# Patient Record
Sex: MALE | Race: WHITE | URBAN - NONMETROPOLITAN AREA
[De-identification: names, ages, dates, MRNs, and addresses within clinical notes are randomized per-mention and may not be internally consistent; named-entity substitution may affect disease eponyms.]

---
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
## 2020-10-19 ENCOUNTER — OFFICE VISIT (OUTPATIENT)
Dept: FAMILY MEDICINE CLINIC | Facility: CLINIC | Age: 20
End: 2020-10-19
Payer: COMMERCIAL

## 2020-10-19 VITALS
BODY MASS INDEX: 37.37 KG/M2 | HEIGHT: 70 IN | DIASTOLIC BLOOD PRESSURE: 78 MMHG | HEART RATE: 73 BPM | WEIGHT: 261 LBS | OXYGEN SATURATION: 99 % | TEMPERATURE: 97 F | SYSTOLIC BLOOD PRESSURE: 118 MMHG | RESPIRATION RATE: 18 BRPM

## 2020-10-19 DIAGNOSIS — N50.3 EPIDIDYMAL CYST: Primary | ICD-10-CM

## 2020-10-19 PROCEDURE — 3078F DIAST BP <80 MM HG: CPT | Performed by: FAMILY MEDICINE

## 2020-10-19 PROCEDURE — 3008F BODY MASS INDEX DOCD: CPT | Performed by: FAMILY MEDICINE

## 2020-10-19 PROCEDURE — 99214 OFFICE O/P EST MOD 30 MIN: CPT | Performed by: FAMILY MEDICINE

## 2020-10-19 PROCEDURE — 3074F SYST BP LT 130 MM HG: CPT | Performed by: FAMILY MEDICINE

## 2020-10-19 NOTE — PROGRESS NOTES
Vesta Rankin is a 21year old male. Patient presents with:   Other: Lump on left testicle       Chief Complaint Reviewed and Verified  Nursing Notes Reviewed and   Verified  Tobacco Reviewed  Allergies Reviewed  Medications Reviewed    Problem List Revie cellulitis  No varicocele  No Hydrocele  No hernia         ASSESSMENT AND PLAN:     Epididymal cyst  (primary encounter diagnosis)    Problem List Items Addressed This Visit     None      Visit Diagnoses     Epididymal cyst    -  Primary    observe only

## 2022-07-16 LAB — AMB EXT COVID-19 RESULT: DETECTED

## 2022-07-19 ENCOUNTER — TELEPHONE (OUTPATIENT)
Dept: FAMILY MEDICINE CLINIC | Facility: CLINIC | Age: 22
End: 2022-07-19
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
## 2022-07-19 NOTE — TELEPHONE ENCOUNTER
Future Appointments   Date Time Provider tSev[address]   7/22/2022 10:00 AM Fernando Irvin MD [facility]     Left msg for pt advising appt has been scheduled. Advised to call office to confirm this time works for him.

## 2022-07-19 NOTE — TELEPHONE ENCOUNTER
COVID+ on 7/16 (at home test) and 7/17 (at [place]). He has a Leave of Absence and RTW form that need to be filled out before returning to work. He would like to to RTW on Friday. Sx onset: 7/14/22. His sx (congestion, chills, sore throat) have resolved. He only has a mild cough. He has not been seen in office in about 2 yrs. He's willing to schedule an appt if needed. Please advise.

## 2022-07-22 ENCOUNTER — OFFICE VISIT (OUTPATIENT)
Dept: FAMILY MEDICINE CLINIC | Facility: CLINIC | Age: 22
End: 2022-07-22
Payer: COMMERCIAL

## 2022-07-22 VITALS
HEIGHT: 70 IN | OXYGEN SATURATION: 98 % | RESPIRATION RATE: 18 BRPM | SYSTOLIC BLOOD PRESSURE: 118 MMHG | DIASTOLIC BLOOD PRESSURE: 78 MMHG | TEMPERATURE: 98 F | WEIGHT: 237.13 LBS | HEART RATE: 79 BPM | BODY MASS INDEX: 33.95 KG/M2

## 2022-07-22 DIAGNOSIS — U07.1 COVID-19: Primary | ICD-10-CM

## 2022-07-22 PROCEDURE — 3078F DIAST BP <80 MM HG: CPT | Performed by: FAMILY MEDICINE

## 2022-07-22 PROCEDURE — 3074F SYST BP LT 130 MM HG: CPT | Performed by: FAMILY MEDICINE

## 2022-07-22 PROCEDURE — 3008F BODY MASS INDEX DOCD: CPT | Performed by: FAMILY MEDICINE

## 2022-07-22 PROCEDURE — 99213 OFFICE O/P EST LOW 20 MIN: CPT | Performed by: FAMILY MEDICINE
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
## 2024-01-02 ENCOUNTER — PATIENT MESSAGE (OUTPATIENT)
Dept: FAMILY MEDICINE CLINIC | Facility: CLINIC | Age: 24
End: 2024-01-02

## 2024-01-02 NOTE — TELEPHONE ENCOUNTER
From: Renard Powers  To: Elroy Irvin  Sent: 1/2/2024 1:34 PM CST  Subject: Influenza A test positive    I just tested positive for Influenza A, I was wondering what you would suggest I do? Take a leave from work or get some medication? Thank you.

## 2024-01-02 NOTE — TELEPHONE ENCOUNTER
See my chart message.    Per patient started with symptoms 3 days ago and are cold/hot flashes, nasal congestion, sore throat, productive cough, headache, Reports temperature of 98.3. Taking ibuprofen and zinc tablets. No shortness of breath. Did not go to work today and next scheduled shift is tomorrow at [employer]. Has increased fluids and rest.    Please advise.

## 2024-01-02 NOTE — TELEPHONE ENCOUNTER
Patient notified of provider comments/recommendations.  Verbalized understanding, does not feel he needs a work not but may need paperwork completed. Will contact office when he has further information on that.

## 2024-01-02 NOTE — TELEPHONE ENCOUNTER
Needs to be off of work for next 2 days  Can give note  After that  If not feeling better  needs to come in and we will evaluate    No medications  Just symptom care

## 2024-01-05 ENCOUNTER — OFFICE VISIT (OUTPATIENT)
Dept: FAMILY MEDICINE CLINIC | Facility: CLINIC | Age: 24
End: 2024-01-05
Payer: COMMERCIAL

## 2024-01-05 VITALS
SYSTOLIC BLOOD PRESSURE: 120 MMHG | HEIGHT: 70 IN | WEIGHT: 232 LBS | BODY MASS INDEX: 33.21 KG/M2 | RESPIRATION RATE: 18 BRPM | TEMPERATURE: 98 F | HEART RATE: 93 BPM | OXYGEN SATURATION: 99 % | DIASTOLIC BLOOD PRESSURE: 70 MMHG

## 2024-01-05 DIAGNOSIS — J10.1 INFLUENZA A: Primary | ICD-10-CM

## 2024-01-05 PROCEDURE — 3008F BODY MASS INDEX DOCD: CPT | Performed by: FAMILY MEDICINE

## 2024-01-05 PROCEDURE — 3078F DIAST BP <80 MM HG: CPT | Performed by: FAMILY MEDICINE

## 2024-01-05 PROCEDURE — 99213 OFFICE O/P EST LOW 20 MIN: CPT | Performed by: FAMILY MEDICINE

## 2024-01-05 PROCEDURE — 3074F SYST BP LT 130 MM HG: CPT | Performed by: FAMILY MEDICINE

## 2024-01-05 NOTE — PROGRESS NOTES
Subjective:   Renard Powers is a 23 year old male who presents for Cough (C/o Lingering Influenza symptoms, c/o cough on and off , congestion nose and throat and mild headache and returning to work paperwork. )     Influenza a positive on 1/2/24  states he still has some symptom   Started 12/31/23 with symptom  Headache mild  no worse   Fever:     Yes []     No [x]        Fatigue: Yes []     No [x]   Nasal congestion : Yes [x]     No []    minor  Sinus pressure: Yes []     No [x]      Sore throat:   Yes [x]      No []      Ear Pain:  Yes []      No [x]        Cough:    Yes [x]     No []    Slight              Dry []     Productive [x] mild   Shortness of breath:  Yes []   No [x]     Wheezing:   Yes []   No [x]      GI symptoms: Yes []     No [x]                     Nausea  []; Vomiting  []; Diarrhea  [] ; Upset stomach [];   Abdominal Pain  []      Feels recovered otherwise    Not considered contagious    Can return to work 1/6/2024    Filled appropriate sick leave papers      History/Other:   currently has no medications in their medication list.     has No Known Allergies.     Review of Systems:  GENERAL HEALTH: see HPI no fever  no aches   SKIN: denies any unusual skin lesions or rashes  RESPIRATORY: denies shortness of breath with exertion  Mild cough  CARDIOVASCULAR: denies chest pain on exertion  GI: denies abdominal pain and denies heartburn  NEURO: denies headaches    Objective:   /70   Pulse 93   Temp 98 °F (36.7 °C) (Temporal)   Resp 18   Ht 5' 10\" (1.778 m)   Wt 232 lb (105.2 kg)   SpO2 99%   BMI 33.29 kg/m²  Estimated body mass index is 33.29 kg/m² as calculated from the following:    Height as of this encounter: 5' 10\" (1.778 m).    Weight as of this encounter: 232 lb (105.2 kg).  GENERAL: well developed, well nourished,in no apparent distress  SKIN: no rashes,no suspicious lesions  NECK: supple,no adenopathy,  LUNGS: clear to auscultation  CARDIO: RRR without murmur    Assessment & Plan:    1. Influenza A (Primary)  Comments:  resolving w no complications  rtw 1/6/24          Elroy Irvin MD, 1/5/2024, 8:22 AM

## 2024-01-09 ENCOUNTER — TELEPHONE (OUTPATIENT)
Dept: FAMILY MEDICINE CLINIC | Facility: CLINIC | Age: 24
End: 2024-01-09

## 2024-11-12 ENCOUNTER — OFFICE VISIT (OUTPATIENT)
Dept: FAMILY MEDICINE CLINIC | Facility: CLINIC | Age: 24
End: 2024-11-12
Payer: OTHER MISCELLANEOUS

## 2024-11-12 VITALS
OXYGEN SATURATION: 99 % | DIASTOLIC BLOOD PRESSURE: 82 MMHG | SYSTOLIC BLOOD PRESSURE: 120 MMHG | BODY MASS INDEX: 32.64 KG/M2 | HEIGHT: 70 IN | TEMPERATURE: 98 F | HEART RATE: 83 BPM | WEIGHT: 228 LBS

## 2024-11-12 DIAGNOSIS — S60.10XA SUBUNGUAL HEMATOMA OF DIGIT OF HAND, INITIAL ENCOUNTER: Primary | ICD-10-CM

## 2024-11-12 PROCEDURE — 11740 EVACUATION SUBUNGUAL HMTMA: CPT | Performed by: FAMILY MEDICINE

## 2024-11-12 PROCEDURE — A4570 SPLINT: HCPCS | Performed by: FAMILY MEDICINE

## 2024-11-12 PROCEDURE — 99213 OFFICE O/P EST LOW 20 MIN: CPT | Performed by: FAMILY MEDICINE

## 2024-11-12 NOTE — PATIENT INSTRUCTIONS
I discussed diagnosis and treatment options.  At patient's request, evacuation of the hematoma was accomplished with cryocautery, moderate amount of serosanguineous fluid expressed  Patient advised to use warm water soaks to try to express any more available fluid  Protective splint applied  Patient advised of likelihood of losing the nail sometime over the next 4 to 6 weeks  Activity as tolerated, may return to work wearing the splint

## 2024-11-12 NOTE — PROGRESS NOTES
Renard Powers is a 24 year old male.  Chief Complaint   Patient presents with    Finger Pain     Smashed finger at work Thursday       case- [employer]  HPI:   11/7 dropped a box of metal shelves on right 3rd finger, swollen and tender, pt did return to work one day  No current outpatient medications on file.      Past Medical History:    Anxiety      Social History:  Social History     Socioeconomic History    Marital status: Single   Tobacco Use    Smoking status: Never    Smokeless tobacco: Never   Vaping Use    Vaping status: Never Used   Substance and Sexual Activity    Alcohol use: Yes    Drug use: Yes     Types: Cannabis        REVIEW OF SYSTEMS:   GENERAL HEALTH: feels well otherwise, denies fatigue, appetite ok  SKIN: denies any unusual skin lesions or rashes  ENT: denies nasal congestion, pnd, sore throat, ear pain or pressure, decreased hearing  RESPIRATORY: denies shortness of breath with exertion,cough, wheezing  CARDIOVASCULAR: denies chest pain on exertion, edema  MSK: see hpi  EXAM:   /82 (BP Location: Left arm, Patient Position: Sitting, Cuff Size: adult)   Pulse 83   Temp 98.3 °F (36.8 °C) (Temporal)   Ht 5' 10\" (1.778 m)   Wt 228 lb (103.4 kg)   SpO2 99%   BMI 32.71 kg/m²   GENERAL: well developed, well nourished,in no apparent distress, well hydrated  SKIN: no rashes,no suspicious lesions  ENT: TMs: intact, good mobility, Nose: turbinates clear, no dc, Throat: no erythema, pnd, or lesions  NECK: supple,no adenopathy,no bruits, no thyromegaly  LUNGS: clear to auscultation, no w/r/r  CARDIO: RRR without murmur, peripheral pulses intact  Right hand: Third digit with proximal subungual hematoma, mild swelling over the cuticle, tender to direct palpation over the distal tuft, good range of motion at the DIP joint without crepitance or significant discomfort, good active extension and flexion    ASSESSMENT AND PLAN:     Encounter Diagnosis   Name Primary?    Subungual hematoma of  digit of hand, initial encounter Yes     No orders of the defined types were placed in this encounter.    Meds & Refills for this Visit:  Requested Prescriptions      No prescriptions requested or ordered in this encounter     Imaging & Consults:  None  No follow-ups on file.  Patient Instructions   I discussed diagnosis and treatment options.  At patient's request, evacuation of the hematoma was accomplished with cryocautery, moderate amount of serosanguineous fluid expressed  Patient advised to use warm water soaks to try to express any more available fluid  Protective splint applied  Patient advised of likelihood of losing the nail sometime over the next 4 to 6 weeks  Activity as tolerated, may return to work wearing the splint    The patient indicates understanding of these issues and agrees to the plan.

## 2025-08-14 ENCOUNTER — OFFICE VISIT (OUTPATIENT)
Dept: FAMILY MEDICINE CLINIC | Facility: CLINIC | Age: 25
End: 2025-08-14

## 2025-08-14 VITALS
RESPIRATION RATE: 12 BRPM | DIASTOLIC BLOOD PRESSURE: 78 MMHG | HEART RATE: 73 BPM | WEIGHT: 207.5 LBS | BODY MASS INDEX: 29.71 KG/M2 | TEMPERATURE: 98 F | HEIGHT: 70 IN | SYSTOLIC BLOOD PRESSURE: 126 MMHG | OXYGEN SATURATION: 100 %

## 2025-08-14 DIAGNOSIS — Z23 NEED FOR DIPHTHERIA-TETANUS-PERTUSSIS (TDAP) VACCINE: ICD-10-CM

## 2025-08-14 DIAGNOSIS — Z00.00 WELL ADULT EXAM: Primary | ICD-10-CM

## 2025-08-14 PROCEDURE — 99395 PREV VISIT EST AGE 18-39: CPT | Performed by: FAMILY MEDICINE

## 2025-08-14 PROCEDURE — 90715 TDAP VACCINE 7 YRS/> IM: CPT | Performed by: FAMILY MEDICINE

## 2025-08-14 PROCEDURE — 90471 IMMUNIZATION ADMIN: CPT | Performed by: FAMILY MEDICINE

## (undated) NOTE — LETTER
[address] [facility], [facility] [place] [id number]  [phone]        Date: 12/14/2017      Patient Name: Salima Richard            To Whom it may concern:     This letter has been written at the [facility]